# Patient Record
(demographics unavailable — no encounter records)

---

## 2025-02-27 NOTE — PHYSICAL EXAM
[TextEntry] : PHYSICAL EXAM   General: The patient was alert, oriented and in no distress. Voice was clear.   Face: The patient had no facial asymmetry or mass. The skin was unremarkable.   Ears: Hearing normal to conversational voice External ears were normal without deformity. Ear canals were clear. No cerumen or inflammation Tympanic membranes were intact and normal. No perforation or effusion. mobile   Nose: The external nose had no significant deformity.  There was no facial tenderness. On anterior rhinoscopy, the nasal mucosa was normal. The anterior septum was grossly midline. There were no visualized polyps, purulence  or masses.   Oral cavity: Oral mucosa- normal. Oral and base of tongue- clear and without mass. Gingival and buccal mucosa- moist and without lesions. Palate- the palate moved well. There was no cleft palate. There appeared to be good salivary flow.  Oral cavity/oropharynx- no pus, erythema or mass   Neck: The neck was symmetrical. The parotid and submandibular glands were normal without masses. The trachea was midline and there was no unusual crepitus. Thyroid was smooth and nontender and no masses were palpated. No masses   Lymphatics: Cervical adenopathy- none.    PROCEDURE:  FLEXIBLE LARYNGOSCOPY, NASAL ENDOSCOPY   Surgeon: Dr. Puga Indication: history of sinusitis, assess for sinus infection, inadequate exam on anterior rhinoscopy Anesthetic: Topical lidocaine and Afrin Procedure: The patient was placed in a sitting position.  Following application of the topical anesthetic and decongestant, exam was performed with a flexible telescope.  The scope was passed along the right nasal floor to the nasopharynx.  It was then passed into the region of the middle meatus, middle turbinate, and sphenoethmoid region.  An identical procedure was performed on the left side.  The following findings were noted:   Nasal mucosa:  edematous, there is a small amount of blood and crusting anteriorly bilaterally Septum: grossly midline   Right nasal cavity -  Post-surgical changes      Inferior turbinate:  hypertrophic      Middle turbinate: normal      Superior turbinate: normal      Inferior meatus: no pus, polyps or congestion      Middle meatus:   Postsurgical changes.  The maxillary and ethmoid sinuses were open.  There was mild edema and a small amount of yellowish drainage.      Superior meatus:  no pus, polyps, or congestion      Sphenoethmoidal recess: moderate amount of thick mucopus.  Culture taken   Left nasal cavity Post surgical changes      Inferior turbinate: hypertrophic      Middle turbinate: normal      Superior turbinate: normal      Inferior meatus: no pus, polyps or congestion      Middle meatus: Postsurgical changes-the maxillary and ethmoid sinuses were open.  There is mild inflammation/edematous mucosa with small amount of yellowish drainage.      Superior meatus:  no pus, polyps, or congestion      Sphenoethmoidal recess: small amount of thick mucopus with congestion

## 2025-02-27 NOTE — ASSESSMENT
[FreeTextEntry1] : She had been doing well until she developed an acute infection.  She is on doxycycline and is feeling better.  She still has inflammation of the sinuses with some drainage.  A culture was taken.  The sinus cavities appeared open  Plan -Findings and management options were discussed with the patient. - Continue nasal saline irrigations, nasal steroid spray, and decongestant - Moisturizing nasal gel - She will continue the doxycycline.  I asked her to call for the culture result.  Depending on the results, we will switch the antibiotic - She may take a short burst of prednisone.  She has taken it in the past without any problems - Follow-up in 2 weeks - Call and return earlier if any problems or worsening symptoms

## 2025-02-27 NOTE — HISTORY OF PRESENT ILLNESS
[de-identified] : MARICHUY WYMAN is a 52 year old patient here for follow-up for sinus infection. She was last seen about a year and a half ago. She had a URI about 2 weeks ago and flew and feels that it developed into a sinus infection. She had a telehealth and was given a course of Doxycycline. She started this on Saturday and feels about 75% better. She continues to have nasal drainage, congestion, and facial/teeth pain. She has been using Flonase, saline rinses, saline mist, and oral decongestants.  ENT history She has a history of recurrent sinusitis she has a dog at home She does not smoke  CT scan. She had a complete opacification of the right maxillary sinus with significant mucosal disease of the right ethmoid sinuses. There is very mild mucosal thickening of the inferior frontal sinuses bilaterally. She did have obstruction of the left OMC and mucosal thickening in the maxillary sinus as well as the ethmoid sinuses on the left side. Possible polyp or other lesion obstructing the right middle meatus could not be ruled out.  She underwent image guided endoscopic sinus surgery with septoplasty and bilateral inferior turbinate submucous resection on January 11, 2023  Pathology was consistent with inflammation. Culture showed growth of strep consellatus